# Patient Record
Sex: FEMALE | Race: BLACK OR AFRICAN AMERICAN | NOT HISPANIC OR LATINO
[De-identification: names, ages, dates, MRNs, and addresses within clinical notes are randomized per-mention and may not be internally consistent; named-entity substitution may affect disease eponyms.]

---

## 2017-04-30 ENCOUNTER — TRANSCRIPTION ENCOUNTER (OUTPATIENT)
Age: 37
End: 2017-04-30

## 2018-08-09 ENCOUNTER — TRANSCRIPTION ENCOUNTER (OUTPATIENT)
Age: 38
End: 2018-08-09

## 2019-04-08 ENCOUNTER — TRANSCRIPTION ENCOUNTER (OUTPATIENT)
Age: 39
End: 2019-04-08

## 2019-08-23 ENCOUNTER — EMERGENCY (EMERGENCY)
Facility: HOSPITAL | Age: 39
LOS: 1 days | Discharge: HOME | End: 2019-08-23
Attending: EMERGENCY MEDICINE
Payer: COMMERCIAL

## 2019-08-23 VITALS
RESPIRATION RATE: 16 BRPM | WEIGHT: 156.97 LBS | HEART RATE: 70 BPM | HEIGHT: 67 IN | OXYGEN SATURATION: 98 % | TEMPERATURE: 99 F | DIASTOLIC BLOOD PRESSURE: 96 MMHG | SYSTOLIC BLOOD PRESSURE: 138 MMHG

## 2019-08-23 PROCEDURE — 99282 EMERGENCY DEPT VISIT SF MDM: CPT

## 2019-08-23 PROCEDURE — 99053 MED SERV 10PM-8AM 24 HR FAC: CPT

## 2019-08-23 NOTE — ED PROVIDER NOTE - CLINICAL SUMMARY MEDICAL DECISION MAKING FREE TEXT BOX
38 yo f presents after sputum exposure to eyes in pt with + blood cultures. pt declines to be put on post exposure prophylaxis. will send post exposure labs, pt agrees with plan to f/u. pt having no current sx. NO blood exposure noted. dc

## 2019-08-23 NOTE — ED ADULT NURSE NOTE - CHPI ED NUR SYMPTOMS NEG
no fever/no nausea/no weakness/no decreased eating/drinking/no dizziness/no vomiting/no pain/no tingling/no chills

## 2019-08-23 NOTE — ED ADULT NURSE NOTE - OBJECTIVE STATEMENT
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  Pt here for Occupational exposure workup. No acute distress noted. Pt reports  immediately flushing her eyes out with water. Provider at bedside.

## 2019-08-23 NOTE — ED PROVIDER NOTE - NSFOLLOWUPINSTRUCTIONS_ED_ALL_ED_FT
1) Please follow up with your Primary Care Provider in 24-48 hours  2) Seek immediate medical care for any new or returning symptoms including but not limited eye burning, vision difficulties, excessive tearing

## 2019-08-23 NOTE — ED PROVIDER NOTE - PHYSICAL EXAMINATION
General: well appearing, interactive, well nourished, no apparent distress  HEENT: EOMI, PERRLA, normal mucosa, normal oropharynx, no lesions on the lips or on oral mucosa, normal external ear  Neck: supple, no lymphadenopathy, full range of motion, no nuchal rigidity  Resp: non labored breathing  Abd: non-distended  MSK: full range of motion  Neuro: moving all extremities  Skin: no rashes, skin intact

## 2019-08-23 NOTE — ED PROVIDER NOTE - ATTENDING CONTRIBUTION TO CARE
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 She washed her face and flushed her eyes for about 10-15 minutes. Patient is concerned because patient is growing gram positive rods in his cultures. She is also concerned because she is going to [facility] on vacation. Denies any recent illnesses. No fevers, chills, nausea, vomiting, cough, chest pain or sob.     VS noted  Gen. no acute distress, Non toxic   HEENT: EOMI, PERRL, mmm, normal conjunctiva, pharynx w/o erythema or exudate  Lungs: CTAB/L no C/ W /R   CVS: RRR   Abd; Soft non tender, non distended   Ext: no edema  Skin: no rash  Neuro AAOx3 non focal clear speech  a/p: sputum in eyes - pt already flushed eyes. Patient declines any prophylaxis but accepted testing for exposure. Paperwork completed and labs drawn.   - Luis MENON

## 2019-08-23 NOTE — ED PROVIDER NOTE - OBJECTIVE STATEMENT
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 pt states she was taking care of a pt who has been spiking intermittent fevers for a few days, and has grown gram + rods without known organism. pt coughed into her eyes this morning. pt washed face and flushed eyes for 10-15 min after incident. pt was concerned to be put on prophylactic meds because she is going to [facility] soon and is unsure of quality of medical care there. pt unsure of sources hiv/hcv status.

## 2019-08-23 NOTE — ED PROVIDER NOTE - NS ED ROS FT
GENERAL: No fever or chills, //             EYES: no change in vision, //             HEENT: no trouble swallowing or speaking, //             CARDIAC: no chest pain, //              PULMONARY: no cough or SOB, //             GI: no abdominal pain, no nausea or no vomiting, no diarrhea or constipation, //             : No changes in urination,  //            SKIN: no rashes,  //            NEURO: no headache,  //             MSK: No joint pain otherwise as HPI or negative. ~Darwin Lowery DO PGY1

## 2020-04-10 ENCOUNTER — EMERGENCY (EMERGENCY)
Facility: HOSPITAL | Age: 40
LOS: 1 days | Discharge: HOME | End: 2020-04-10
Attending: EMERGENCY MEDICINE
Payer: COMMERCIAL

## 2020-04-10 VITALS
RESPIRATION RATE: 18 BRPM | OXYGEN SATURATION: 100 % | WEIGHT: 151.9 LBS | SYSTOLIC BLOOD PRESSURE: 151 MMHG | HEART RATE: 100 BPM | TEMPERATURE: 100 F | HEIGHT: 68 IN | DIASTOLIC BLOOD PRESSURE: 93 MMHG

## 2020-04-10 VITALS
RESPIRATION RATE: 19 BRPM | HEART RATE: 89 BPM | DIASTOLIC BLOOD PRESSURE: 92 MMHG | OXYGEN SATURATION: 100 % | SYSTOLIC BLOOD PRESSURE: 139 MMHG

## 2020-04-10 LAB — SARS-COV-2 RNA SPEC QL NAA+PROBE: SIGNIFICANT CHANGE UP

## 2020-04-10 PROCEDURE — 87635 SARS-COV-2 COVID-19 AMP PRB: CPT

## 2020-04-10 PROCEDURE — 99283 EMERGENCY DEPT VISIT LOW MDM: CPT

## 2020-04-10 PROCEDURE — 99284 EMERGENCY DEPT VISIT MOD MDM: CPT

## 2020-04-10 NOTE — ED PROVIDER NOTE - OBJECTIVE STATEMENT
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 Pt's  is requiring she be tested prior to returning to work. Pt denies f/c/n/v/d. Pt denies cough or SOB or CP.

## 2020-04-10 NOTE — ED ADULT NURSE NOTE - OBJECTIVE STATEMENT
40 y/o female here for respiratory virus rule out. C/o intermittent cough, sore throat, temp of 99F. 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 No SOB, chest pain, fever. PT well appearing, in no acute distress. Education and discharge instructions provided and pt ambulated independently out of building.

## 2020-04-10 NOTE — ED PROVIDER NOTE - PATIENT PORTAL LINK FT
You can access the FollowMyHealth Patient Portal offered by [facility] by registering at the following website: [email or web address]. By joining [employer]’s FollowMyHealth portal, you will also be able to view your health information using other applications (apps) compatible with our system.

## 2020-04-10 NOTE — ED PROVIDER NOTE - CLINICAL SUMMARY MEDICAL DECISION MAKING FREE TEXT BOX
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 VS stable, O2 sats 98% RA, stable for dc -[place] Pt is 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 VS stable, O2 sats 98% RA, stable for dc -[facility]  ATTG: : HCP with concern for COVID -19 with known exposure. as per current hospital policy will test for COVID and rec patient continue self quarantine protocols until results available.

## 2020-04-10 NOTE — ED PROVIDER NOTE - ATTENDING CONTRIBUTION TO CARE
40 y/o f with pmhx HTN, presents for sorethroat that began approx 2 days ago. 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 no fever no cough. no chest pain no heart palp. no diarrhea. no change in smell / taste. Has cared for several COVID-19 pos patients. no recent travel.   Gen.  no acute distress  HEENT:  perrl eomi  Lungs:  b/l bs  CVS: S1S2   Abd;  soft non tender no distention  Ext: no pitting edema or erythema  Neuro: aaox3 no focal deficits  MSK: strength 5/5 b/l upper and lower ext.

## 2020-04-10 NOTE — ED ADULT NURSE NOTE - CHPI ED NUR SYMPTOMS NEG
no chills/no rash/no shortness of breath/no vomiting/no headache/no diarrhea/no abdominal pain/no decreased eating/drinking/no fever

## 2020-04-10 NOTE — ED PROVIDER NOTE - NSFOLLOWUPINSTRUCTIONS_ED_ALL_ED_FT
YOU SHOULD RECEIVE A TEXT WITH THE RESULTS OF YOUR SWAB TODAY    IF YOU DO NOT RECEIVE THE TEXT FOR SOME REASON YOU CAN ALWAYS CALL MEDICAL RECORDS TO HAVE YOUR RESULTS RELEASED    PLEASE FOLLOW UP WITH YOUR PRIMARY DOCTOR    PLEASE RETURN TO THE EMERGENCY ROOM IF YOU HAVE NEW DIFFICULTY BREATHING, IF YOU FAINT OR FEEL LIKE YOU ARE GOING TO FAINT, IF YOU HAVE SEVERE FEVERS/CHILLS THAT DON'T IMPROVE WITH TYLENOL OR IBUPROFEN, OR IF YOU HAVE ANY OTHER NEW SYMPTOMS THAT ARE CONCERNING

## 2020-04-25 ENCOUNTER — MESSAGE (OUTPATIENT)
Age: 40
End: 2020-04-25

## 2020-05-02 ENCOUNTER — APPOINTMENT (OUTPATIENT)
Dept: DISASTER EMERGENCY | Facility: HOSPITAL | Age: 40
End: 2020-05-02

## 2020-05-03 LAB
SARS-COV-2 IGG SERPL IA-ACNC: 0.1 INDEX
SARS-COV-2 IGG SERPL QL IA: NEGATIVE

## 2020-11-19 ENCOUNTER — APPOINTMENT (OUTPATIENT)
Dept: ORTHOPEDIC SURGERY | Facility: CLINIC | Age: 40
End: 2020-11-19
Payer: COMMERCIAL

## 2020-11-19 VITALS — WEIGHT: 151 LBS | HEIGHT: 68 IN | BODY MASS INDEX: 22.88 KG/M2

## 2020-11-19 DIAGNOSIS — G89.29 LUMBAGO WITH SCIATICA, RIGHT SIDE: ICD-10-CM

## 2020-11-19 DIAGNOSIS — M54.41 LUMBAGO WITH SCIATICA, RIGHT SIDE: ICD-10-CM

## 2020-11-19 DIAGNOSIS — Z78.9 OTHER SPECIFIED HEALTH STATUS: ICD-10-CM

## 2020-11-19 DIAGNOSIS — Z86.79 PERSONAL HISTORY OF OTHER DISEASES OF THE CIRCULATORY SYSTEM: ICD-10-CM

## 2020-11-19 DIAGNOSIS — Z83.511 FAMILY HISTORY OF GLAUCOMA: ICD-10-CM

## 2020-11-19 DIAGNOSIS — Z82.49 FAMILY HISTORY OF ISCHEMIC HEART DISEASE AND OTHER DISEASES OF THE CIRCULATORY SYSTEM: ICD-10-CM

## 2020-11-19 PROCEDURE — 99204 OFFICE O/P NEW MOD 45 MIN: CPT

## 2020-11-19 PROCEDURE — 73502 X-RAY EXAM HIP UNI 2-3 VIEWS: CPT | Mod: RT

## 2020-11-19 PROCEDURE — 72100 X-RAY EXAM L-S SPINE 2/3 VWS: CPT

## 2020-11-19 RX ORDER — CYCLOBENZAPRINE HYDROCHLORIDE 5 MG/1
5 TABLET, FILM COATED ORAL
Qty: 28 | Refills: 0 | Status: ACTIVE | COMMUNITY
Start: 2020-11-19 | End: 1900-01-01

## 2020-11-19 RX ORDER — VALSARTAN 160 MG/1
160 TABLET ORAL
Refills: 0 | Status: ACTIVE | COMMUNITY

## 2020-11-19 RX ORDER — PREDNISONE 50 MG/1
50 TABLET ORAL DAILY
Qty: 5 | Refills: 0 | Status: ACTIVE | COMMUNITY
Start: 2020-11-19 | End: 1900-01-01

## 2020-11-19 RX ORDER — MELOXICAM 7.5 MG/1
7.5 TABLET ORAL
Qty: 14 | Refills: 0 | Status: ACTIVE | COMMUNITY
Start: 2020-11-19 | End: 1900-01-01

## 2020-11-21 NOTE — PHYSICAL EXAM
[de-identified] : LUMBAR SPINE\par Inspection reveals no scoliosis\par Range of motion testing demonstrates pain with flexion, full ROM\par Facet loading maneuver negative\par + tenderness to palpation of lumbar paraspinal muscles. \par Seated slump test negative\par Straight leg raise negative\par HIPS/PELVIS -\par Symmetric in standing and lying \par Hip maneuvers:\par  Range of motion full and painfree\par passive hip impingement sign negative\par MANUEL mild +\par FADIR mild +\par Log roll negative\par Sacroiliac maneuvers:no TTP of  bilateral PSIS \par AP glide negative\par Yazan's negative\par Resisted active straight leg raise negative\par + TTP of the rightbuttock, greater trochanters, IT band \par NEURO - Normal bulk and tone \par LE strength 5/5 including hip flexion, knee flexion, knee extension, ankle dorsiflexion, ankle plantarflexion, eversion, and EHL \par Toe-walking and heel-walking intact\par Sensation - intact to light touch in bilateral lower extremities. \par LE Reflexes 2+ patellar and Achilles reflexes bilaterally \par no Clonus\par Coordination was age appropriate and intact in all 4 limbs. \par GAIT - Normal base, normal stride length, non-antalgic\par Single leg squat with contralateral pelvic tilt and medial knee drive bilaterally.\par \par  [de-identified] : \par The following radiographs were ordered and read by me during this patient's visit. I reviewed each radiograph in detail with the patient and discussed the findings as highlighted below. \par \par 3 views of the right hip and 2 views of the lumbar spine were obtained today that show no fracture, or dislocation. There are no degenerative changes seen. There is no malalignment. No obvious osseous abnormality. Otherwise unremarkable.\par

## 2020-11-21 NOTE — HISTORY OF PRESENT ILLNESS
[de-identified] : Ms. VANDANA SAAB is a very pleasant 40 year female who comes in for evaluation of Right sided LBP and R buttock pain that has been ongoing for 4 months without any specific injury or inciting event. 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 Patient states that she initially had right foot pain, saw a podiatrist, was dx with plantar fasciitis. She was treated with PT and a CSI with relief. During this time, she also noted that she has right sided low back pain with radiation into the groin, buttock and down the posterior thigh up to the knee, initially intermittent, has now become constant in nature and described as sharp and achy . The pain is rated as 10/10 during today's visit, and ranges from 3-10/10. The patient's symptoms are aggravated by prolonged standing, driving, bending and twisting and alleviated by motrin 600 1x daily PRN before shifts and heat . The patient denies any night pain, numbness/tingling, weakness, or bowel/bladder dysfunction. The patient has no other complaints at this time.\par \par No imaging of the low back. \par Has done PT for plantar fasciitis. \par

## 2020-11-21 NOTE — DISCUSSION/SUMMARY
[de-identified] : Discussed findings of today's exam and possible causes of patient's pain.  Educated patient on their most probable diagnosis of lumbar radiculopathy, gluteal tendinopathy, and gluteus medius weakness.  Reviewed possible courses of treatment, and we collaboratively decided best course of treatment at this time will include:\par 1. referral to PT to improve gluteal strength\par 2. short course of prednisone followed by meloxicam if needed\par 3. cyclobenzaprine prn nightly \par \par Follow up in 6 weeks.\par \par Nasra Larsen MD, EdM\par Sports Medicine PM&R\par \par

## 2021-03-18 ENCOUNTER — OUTPATIENT (OUTPATIENT)
Dept: OUTPATIENT SERVICES | Facility: HOSPITAL | Age: 41
LOS: 1 days | End: 2021-03-18
Payer: COMMERCIAL

## 2021-03-18 ENCOUNTER — APPOINTMENT (OUTPATIENT)
Dept: MAMMOGRAPHY | Facility: IMAGING CENTER | Age: 41
End: 2021-03-18
Payer: COMMERCIAL

## 2021-03-18 DIAGNOSIS — Z00.8 ENCOUNTER FOR OTHER GENERAL EXAMINATION: ICD-10-CM

## 2021-03-18 PROCEDURE — 77063 BREAST TOMOSYNTHESIS BI: CPT | Mod: 26

## 2021-03-18 PROCEDURE — 77063 BREAST TOMOSYNTHESIS BI: CPT

## 2021-03-18 PROCEDURE — 77067 SCR MAMMO BI INCL CAD: CPT | Mod: 26

## 2021-03-18 PROCEDURE — 77067 SCR MAMMO BI INCL CAD: CPT

## 2021-05-05 ENCOUNTER — APPOINTMENT (OUTPATIENT)
Dept: ORTHOPEDIC SURGERY | Facility: CLINIC | Age: 41
End: 2021-05-05
Payer: COMMERCIAL

## 2021-05-05 VITALS — SYSTOLIC BLOOD PRESSURE: 136 MMHG | HEIGHT: 68 IN | DIASTOLIC BLOOD PRESSURE: 83 MMHG | HEART RATE: 72 BPM

## 2021-05-05 DIAGNOSIS — M22.2X2 PATELLOFEMORAL DISORDERS, RIGHT KNEE: ICD-10-CM

## 2021-05-05 DIAGNOSIS — Z00.00 ENCOUNTER FOR GENERAL ADULT MEDICAL EXAMINATION W/OUT ABNORMAL FINDINGS: ICD-10-CM

## 2021-05-05 DIAGNOSIS — M22.2X1 PATELLOFEMORAL DISORDERS, RIGHT KNEE: ICD-10-CM

## 2021-05-05 PROCEDURE — 73564 X-RAY EXAM KNEE 4 OR MORE: CPT | Mod: 50

## 2021-05-05 PROCEDURE — 99072 ADDL SUPL MATRL&STAF TM PHE: CPT

## 2021-05-05 PROCEDURE — 99213 OFFICE O/P EST LOW 20 MIN: CPT

## 2021-05-05 NOTE — DISCUSSION/SUMMARY
[de-identified] : The patient has bilat patellofemoral sybndrome. They are not an appropriate candidate for surgical intervention at this time. An extensive discussion was conducted on the natural history of the disease and the variety of surgical and non-surgical options available to the patient including, but not limited to non-steroidal anti-inflammatory medications, steroid injections, physical therapy, maintenance of ideal body weight, and reduction of activity. I recommended and prescribed a course of physical therapy.  Over-the-counter NSAIDs.  Pain.  Recommended to use a neoprene sleeve knee brace.  The patient will schedule an appointment as needed.

## 2021-05-05 NOTE — PHYSICAL EXAM
[de-identified] : Patient is well nourished, well-developed, in no acute distress, with appropriate mood and affect. The patient is oriented to time, place, and person. Respirations are even and unlabored. Gait evaluation does not reveal a limp. There is no inguinal adenopathy. The right limb is well-perfused and showed 2+ dp/pt pulses, without skin lesions, shows a grossly normal motor and sensory examination. Right knee motion is painless and the knee moves from 0 to 135 degrees. The knee is stable within that range-of-motion to AP and ML stress with a 1A Lachman, negative anterior or posterior drawer and no instability to varus or valgus stress. The alignment of the knee is 5 degrees varus. No effusion or crepitus is noted. No tenderness to palpation about the medial or lateral joint line, medial or lateral tibial plateau, medial or lateral femoral condyle, medial or lateral patellar facets, superior or inferior pole of the patella. Zaid's is negative. Muscle strength is normal. Pedal pulses are palpable. Hip examination was negative. The left limb is well-perfused and showed 2+ dp/pt pulses, without skin lesions, shows a grossly normal motor and sensory examination. Left knee motion is painless and the knee moves from 0 to 135 degrees. The knee is stable within that range-of-motion to AP and ML stress with a 1A Lachman, negative anterior or posterior drawer and no instability to varus or valgus stress. The alignment of the knee is 5 degrees varus. No effusion or crepitus is noted. No tenderness to palpation about the medial or lateral joint line, medial or lateral tibial plateau, medial or lateral femoral condyle, medial or lateral patellar facets, superior or inferior pole of the patella. Zaid's is negative. Muscle strength is normal. Pedal pulses are palpable. Hip examination was negative. [de-identified] : Long standing knee, AP knee, lateral knee, and patellar views of the bilateral knee were ordered and taken in the office and demonstrate no evidence of degenerative joint disease of the knee, fractures, intra-articular pathology.

## 2021-05-05 NOTE — HISTORY OF PRESENT ILLNESS
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 The pain somewhat limits activities of daily living. Walking tolerance is reduced. There has been no treatment thus far except for Advil 600mg prn  The patient denies any radiation of the pain to the feet and it is not associated with numbness, tingling, or weakness.  She denies any catching clicking or locking in the knees and the knee is not giving way.  She does not use a cane or walker.  She has not tried formal physical therapy.

## 2021-10-20 ENCOUNTER — NON-APPOINTMENT (OUTPATIENT)
Age: 41
End: 2021-10-20

## 2021-10-21 ENCOUNTER — APPOINTMENT (OUTPATIENT)
Dept: ORTHOPEDIC SURGERY | Facility: CLINIC | Age: 41
End: 2021-10-21
Payer: COMMERCIAL

## 2021-10-21 VITALS
DIASTOLIC BLOOD PRESSURE: 76 MMHG | HEIGHT: 68 IN | BODY MASS INDEX: 22.88 KG/M2 | HEART RATE: 79 BPM | SYSTOLIC BLOOD PRESSURE: 115 MMHG | WEIGHT: 151 LBS

## 2021-10-21 DIAGNOSIS — M75.42 IMPINGEMENT SYNDROME OF LEFT SHOULDER: ICD-10-CM

## 2021-10-21 PROCEDURE — 99203 OFFICE O/P NEW LOW 30 MIN: CPT

## 2021-10-21 RX ORDER — MELOXICAM 7.5 MG/1
7.5 TABLET ORAL
Qty: 30 | Refills: 0 | Status: ACTIVE | COMMUNITY
Start: 2021-10-21 | End: 1900-01-01

## 2021-10-21 NOTE — DISCUSSION/SUMMARY
[de-identified] : Discussed findings of today's exam and possible causes of patient's pain.  Educated patient on their most probable diagnosis of left shoulder impingement, low likelihood of rotator cuff tear.  Reviewed possible courses of treatment, and we collaboratively decided best course of treatment at this time will include conservative management.  Patient started on a course of oral NSAIDs, prescription given for Mobic (We discussed all possible side effects of this medication).  Patient will be started on a course of physical therapy to restore normal range of motion and strength as tolerated.  May consider cortisone injection if not improving with the above measures.  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  Follow up as needed.  Patient appreciates and agrees with current plan.\par \par I work as part of an academic orthopedic group and routinely have a physician in training (resident / fellow) working with me.  Any part of the history and physical exam performed by the physician in training was either directly reviewed and/or replicated by myself.  Any procedure performed by the physician in training was performed under my direct supervision and with the consent of the patient.\par \par This note was generated using dragon medical dictation software.  A reasonable effort has been made for proofreading its contents, but typos may still remain.  If there are any questions or points of clarification needed please notify my office.\par

## 2021-10-21 NOTE — PHYSICAL EXAM
[de-identified] : Constitutional: Well-nourished, well-developed, No acute distress\par Respiratory:  Good respiratory effort, no SOB\par Psychiatric: Pleasant and normal affect, alert and oriented x3\par Skin: Clean dry and intact B/L UE\par Musculoskeletal: normal except where as noted in regional exam\par \par \par Right Shoulder:\par APPEARANCE: no marked deformities, no swelling or malalignment\par POSITIVE TENDERNESS: none\par NONTENDER: supraspinatus, infraspinatus, teres minor, LH biceps, anterior and posterior capsule, AC joint\par ROM: full & painless, no scapular winging or dyskinesia present\par RESISTIVE TESTING: painless 5/5 resisted flex/ext, empty can/ER/IR, horizontal abd/add \par SPECIAL TESTS: neg Drop Arm, neg Empty Can, neg Eisenberg/Neers, neg Russell's, neg Speeds, neg Apprehension, neg cross arm adduction, neg apley's scratch test\par \par Left Shoulder:\par APPEARANCE: no marked deformities, no swelling or malalignment\par POSITIVE TENDERNESS: supraspinatus, long head biceps tendon\par NONTENDER:  infraspinatus, teres minor. biceps. anterior and posterior capsule. AC joint. \par ROM: full with mild painful arc past 60 degrees, no scapular winging or dyskinesia present\par RESISTIVE TESTING: MMT 4+/5 ER, Flexion and Empty can, 5/5 IR. painless 5/5 resisted ext, horizontal abd/add \par SPECIAL TESTS: + Eisenberg and Neers, mildly + cross arm adduction, + Speeds, neg Russell's, neg Drop Arm, neg Apprehension. neg apley's scratch test\par \par

## 2021-10-21 NOTE — HISTORY OF PRESENT ILLNESS
[de-identified] : RHD 42 y/o F presents with 1 week of constant L shoulder pain. Pain started after moving a chair, she felt a sharp pain in her anterior superior shoulder. Pain has been a constant burning ache, but when she lifts her arm is becomes sharp. She has had an episode of numbness and tingling radiating down her entire arm, that went away after stretching and massaging it. She notices increased pain with motion. She has difficulty sleeping on her left side. She has taken Motrin 600mg with temporary relief. She uses ice with temporary relief. Pain remain in superior anterior shoulder mostly. She denies images.\par \par The patient's past medical history, past surgical history, medications and allergies were reviewed by me today and documented accordingly. In addition, the patient's family and social history, which were noncontributory to this visit, were reviewed also. Intake form was reviewed. The patient has no family history of arthritis.

## 2022-06-13 ENCOUNTER — OUTPATIENT (OUTPATIENT)
Dept: OUTPATIENT SERVICES | Facility: HOSPITAL | Age: 42
LOS: 1 days | End: 2022-06-13
Payer: COMMERCIAL

## 2022-06-13 ENCOUNTER — APPOINTMENT (OUTPATIENT)
Dept: MAMMOGRAPHY | Facility: IMAGING CENTER | Age: 42
End: 2022-06-13
Payer: COMMERCIAL

## 2022-06-13 DIAGNOSIS — Z00.8 ENCOUNTER FOR OTHER GENERAL EXAMINATION: ICD-10-CM

## 2022-06-13 PROCEDURE — 77063 BREAST TOMOSYNTHESIS BI: CPT

## 2022-06-13 PROCEDURE — 77067 SCR MAMMO BI INCL CAD: CPT | Mod: 26

## 2022-06-13 PROCEDURE — 77067 SCR MAMMO BI INCL CAD: CPT

## 2022-06-13 PROCEDURE — 77063 BREAST TOMOSYNTHESIS BI: CPT | Mod: 26

## 2022-08-03 ENCOUNTER — OUTPATIENT (OUTPATIENT)
Dept: OUTPATIENT SERVICES | Facility: HOSPITAL | Age: 42
LOS: 1 days | End: 2022-08-03
Payer: COMMERCIAL

## 2022-08-03 ENCOUNTER — APPOINTMENT (OUTPATIENT)
Dept: ULTRASOUND IMAGING | Facility: IMAGING CENTER | Age: 42
End: 2022-08-03

## 2022-08-03 DIAGNOSIS — Z00.8 ENCOUNTER FOR OTHER GENERAL EXAMINATION: ICD-10-CM

## 2022-08-03 PROCEDURE — 76641 ULTRASOUND BREAST COMPLETE: CPT | Mod: 26,50

## 2022-08-03 PROCEDURE — 76641 ULTRASOUND BREAST COMPLETE: CPT

## 2022-08-04 ENCOUNTER — NON-APPOINTMENT (OUTPATIENT)
Age: 42
End: 2022-08-04

## 2023-01-19 ENCOUNTER — NON-APPOINTMENT (OUTPATIENT)
Age: 43
End: 2023-01-19

## 2023-05-09 ENCOUNTER — NON-APPOINTMENT (OUTPATIENT)
Age: 43
End: 2023-05-09

## 2023-05-09 ENCOUNTER — APPOINTMENT (OUTPATIENT)
Dept: ORTHOPEDIC SURGERY | Facility: CLINIC | Age: 43
End: 2023-05-09
Payer: OTHER MISCELLANEOUS

## 2023-05-09 PROCEDURE — 99213 OFFICE O/P EST LOW 20 MIN: CPT

## 2023-05-09 RX ORDER — MELOXICAM 7.5 MG/1
7.5 TABLET ORAL
Qty: 30 | Refills: 0 | Status: ACTIVE | COMMUNITY
Start: 2023-05-09 | End: 1900-01-01

## 2023-05-09 NOTE — PHYSICAL EXAM
[de-identified] : Constitutional: Well-nourished, well-developed, No acute distress\par Respiratory:  Good respiratory effort, no SOB\par Lymphatic: No regional lymphadenopathy, no lymphedema\par Psychiatric: Pleasant and normal affect, alert and oriented x3\par Musculoskeletal: normal except where as noted in regional exam\par \par \par Right Shoulder:\par APPEARANCE: no marked deformities, no swelling or malalignment\par POSITIVE TENDERNESS: supraspinatus, long head biceps tendon\par NONTENDER:  infraspinatus, teres minor. biceps. anterior and posterior capsule. AC joint. \par ROM: full with mild painful arc past 60 degrees, no scapular winging or dyskinesia present\par RESISTIVE TESTING: MMT 4+/5 ER, Flexion and Empty can, 5/5 IR. painless 5/5 resisted ext, horizontal abd/add \par SPECIAL TESTS: + Eisenberg and Neers, mildly + cross arm adduction, + Speeds, neg Russell's, neg Drop Arm, neg Apprehension. neg apley's scratch test\par \par

## 2023-05-09 NOTE — RETURN TO WORK/SCHOOL
[FreeTextEntry1] : Silvia was seen today for evaluation of right shoulder orthopedic injury.  Please excuse her from work until reassessed in 2 weeks.  This note expires on 5/23/2023.\par Should you have any questions please call the office at [phone]\par Thank you for your understanding.\par \par Sincerely,\par \par Kory Black DO, ATC\par Primary Care Sports Medicine\par [facility] [place]\par

## 2023-05-09 NOTE — DISCUSSION/SUMMARY
[de-identified] : Discussed findings of today's exam and possible causes of patient's pain.  Educated patient on their most probable diagnosis of acute right shoulder impingement with concomitant bicipital tenosynovitis.  Reviewed possible courses of treatment, and we collaboratively decided best course of treatment at this time will include conservative management.  Patient has no evidence of any significant myofascial tear or rupture, no surgical indications, no need for MRI at this time.  Patient started on a course of oral NSAIDs, prescription given for Mobic (We discussed all possible side effects of this medication).  Patient will be started on a course of physical therapy to restore normal range of motion and strength as tolerated.  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  Recommend she be out of work for the next 2 weeks, we will reassess her at that time.  Patient appreciates and agrees with current plan.\par \par I work as part of an academic orthopedic group and routinely have a physician in training (resident / fellow) working with me.  Any part of the history and physical exam performed by the physician in training was either directly reviewed and/or replicated by myself.  Any procedure performed by the physician in training was performed under my direct supervision and with the consent of the patient.\par \par This note was generated using dragon medical dictation software.  A reasonable effort has been made for proofreading its contents, but typos may still remain.  If there are any questions or points of clarification needed please notify my office.

## 2023-05-09 NOTE — HISTORY OF PRESENT ILLNESS
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 She has done gentle stretching, ice, motrin however pain is still present. This injury occurred Saturday morning, 4 day prior to today. Pain is localized anterior shoulder, she denies radicular features.

## 2023-05-22 ENCOUNTER — APPOINTMENT (OUTPATIENT)
Dept: ORTHOPEDIC SURGERY | Facility: CLINIC | Age: 43
End: 2023-05-22
Payer: OTHER MISCELLANEOUS

## 2023-05-22 VITALS — BODY MASS INDEX: 23.95 KG/M2 | HEIGHT: 68 IN | WEIGHT: 158 LBS

## 2023-05-22 PROCEDURE — 99213 OFFICE O/P EST LOW 20 MIN: CPT

## 2023-05-22 NOTE — PHYSICAL EXAM
[de-identified] : Constitutional: Well-nourished, well-developed, No acute distress\par Respiratory:  Good respiratory effort, no SOB\par Lymphatic: No regional lymphadenopathy, no lymphedema\par Psychiatric: Pleasant and normal affect, alert and oriented x3\par Musculoskeletal: normal except where as noted in regional exam\par \par \par Right Shoulder:\par APPEARANCE: no marked deformities, no swelling or malalignment\par POSITIVE TENDERNESS: supraspinatus, long head biceps tendon\par NONTENDER:  infraspinatus, teres minor. biceps. anterior and posterior capsule. AC joint. \par ROM: full with mild painful arc past 60 degrees, no scapular winging or dyskinesia present\par RESISTIVE TESTING: MMT 4+/5 ER, Flexion and Empty can, 5/5 IR. painless 5/5 resisted ext, horizontal abd/add \par SPECIAL TESTS: + Eisenberg and Neers, mildly + cross arm adduction, + Speeds, neg Russell's, neg Drop Arm, neg Apprehension. neg apley's scratch test\par \par

## 2023-05-22 NOTE — RETURN TO WORK/SCHOOL
[FreeTextEntry1] : Silvia was seen today for reevaluation of right shoulder orthopedic injury.  She is still 100% temporarily disabled and cannot perform her work duties.  She will be reassessed in 2 weeks.  This note expires on 6/5/2023.\par Should you have any questions please call the office at [phone]\par Thank you for your understanding.\par \par Sincerely,\par \par Kory Black DO, ATC\par Primary Care Sports Medicine\par [facility] [place]\par

## 2023-05-22 NOTE — DISCUSSION/SUMMARY
[de-identified] : Patient was seen today for reevaluation of right shoulder impingement.  Patient has only been able to attend 2 visits of physical therapy since last evaluation.  At this time she has no significant change in her clinical exam, recommend continued conservative measures.  No indication for MRI at this time.  Patient will continue meloxicam once a day with food and Tylenol as needed for breakthrough pain.  She will continue her course of physical therapy.  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  If patient still having persisting pain at that time may consider cortisone injection.  I think it is too soon to consider cortisone injection only 2 weeks after initial injury and only 2 visits of therapy thus far.  Patient appreciates and agrees with current plan.\par  \par \par This note was generated using dragon medical dictation software.  A reasonable effort has been made for proofreading its contents, but typos may still remain.  If there are any questions or points of clarification needed please notify my office.

## 2023-05-22 NOTE — HISTORY OF PRESENT ILLNESS
[de-identified] : Patient is here for right shoulder pain follow up. Patient has attended 2 PT sessions and taken Meloxicam since last visit. There was no recent injury.\par Patient states that she has noticed some neck/left shoulder pain which she feels is from compensating due to this injury. She was advised as this is not part of her WC claim we cannot address this issue today.

## 2023-06-05 ENCOUNTER — APPOINTMENT (OUTPATIENT)
Dept: ORTHOPEDIC SURGERY | Facility: CLINIC | Age: 43
End: 2023-06-05
Payer: OTHER MISCELLANEOUS

## 2023-06-05 VITALS — BODY MASS INDEX: 24.25 KG/M2 | WEIGHT: 160 LBS | HEIGHT: 68 IN

## 2023-06-05 PROCEDURE — 99214 OFFICE O/P EST MOD 30 MIN: CPT

## 2023-06-05 PROCEDURE — 73030 X-RAY EXAM OF SHOULDER: CPT | Mod: RT

## 2023-06-05 NOTE — PHYSICAL EXAM
[de-identified] : Constitutional: Well-nourished, well-developed, No acute distress\par Respiratory:  Good respiratory effort, no SOB\par Lymphatic: No regional lymphadenopathy, no lymphedema\par Psychiatric: Pleasant and normal affect, alert and oriented x3\par Musculoskeletal: normal except where as noted in regional exam\par \par \par Right Shoulder:\par APPEARANCE: no marked deformities, no swelling or malalignment\par POSITIVE TENDERNESS: supraspinatus, long head biceps tendon\par NONTENDER:  infraspinatus, teres minor. biceps. anterior and posterior capsule. AC joint. \par ROM: full with mild painful arc past 60 degrees, no scapular winging or dyskinesia present\par RESISTIVE TESTING: MMT 4+/5 ER, Flexion and Empty can, 5/5 IR. painless 5/5 resisted ext, horizontal abd/add \par SPECIAL TESTS: + Eisenberg and Neers, mildly + cross arm adduction, + Speeds, neg Russell's, neg Drop Arm, neg Apprehension. neg apley's scratch test\par \par  [de-identified] : The following radiographs were ordered and read by me during this patient's visit. I reviewed each radiograph in detail with the patient and discussed the findings as highlighted below. \par \par 3 views of the right shoulder were obtained today that show no fracture, or dislocation. There are no degenerative changes seen. There is no malalignment. No obvious osseous abnormality. Otherwise unremarkable.\par \par

## 2023-06-05 NOTE — DISCUSSION/SUMMARY
[de-identified] : Patient was seen today for reevaluation of persisting right shoulder pain secondary to subacromial impingement and bicipital tenosynovitis.  Patient has had little response to conservative measures including meloxicam and 8+ visits of physical therapy.  With lack of response to conservative measures and continued pain I recommend we proceed with MRI to evaluate for extent of soft tissue pathology.  Patient feels like the meloxicam is not effective for her anymore.  Patient started on a course of oral NSAIDs, prescription given for diclofenac (We discussed all possible side effects of this medication).  Patient will continue her course of physical therapy.  Depending on results of her MRI we may consider surgical consultation versus cortisone injection.  Patient will follow-up as soon as MRI results are available.  Patient appreciates and agrees with current plan.\par \par I work as part of an academic orthopedic group and routinely have a physician in training (resident / fellow) working with me.  Any part of the history and physical exam performed by the physician in training was either directly reviewed and/or replicated by myself.  Any procedure performed by the physician in training was performed under my direct supervision and with the consent of the patient.\par \par This note was generated using dragon medical dictation software.  A reasonable effort has been made for proofreading its contents, but typos may still remain.  If there are any questions or points of clarification needed please notify my office.

## 2023-06-05 NOTE — RETURN TO WORK/SCHOOL
[FreeTextEntry1] : Silvia was seen today for reevaluation of right shoulder pain.  She continues to be unable to perform her work duties.  She is still 100% temporarily disabled.  She is undergoing further evaluation with MRI.  She will be reassessed in 2 weeks.  This note expires on 6/19/2023.\par Should you have any questions please call the office at [phone]\par Thank you for your understanding.\par \par Sincerely,\par \par Koyr Black DO, ATC\par Primary Care Sports Medicine\par [facility] [place]\par

## 2023-06-05 NOTE — HISTORY OF PRESENT ILLNESS
[de-identified] : Patient is here for right shoulder pain follow up. 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 She felt a pop in the anterior right shoulder. Patient has done 8 sessions of PT so far over the last month. She is taking Meloxicam daily and using Tylenol and lidocaine patches for breakthrough pain. Patient still reports pain in the anterior right shoulder with driving and putting on a shirt. Still not back at work. Patient reports mild weakness in the RUE, but no numbness/tingling.

## 2023-06-06 ENCOUNTER — FORM ENCOUNTER (OUTPATIENT)
Age: 43
End: 2023-06-06

## 2023-06-21 ENCOUNTER — APPOINTMENT (OUTPATIENT)
Dept: ORTHOPEDIC SURGERY | Facility: CLINIC | Age: 43
End: 2023-06-21
Payer: OTHER MISCELLANEOUS

## 2023-06-21 PROCEDURE — 20610 DRAIN/INJ JOINT/BURSA W/O US: CPT | Mod: RT

## 2023-06-21 PROCEDURE — 99214 OFFICE O/P EST MOD 30 MIN: CPT | Mod: 25

## 2023-06-21 NOTE — HISTORY OF PRESENT ILLNESS
[de-identified] : Patient is here for right shoulder pain, patient reports she had MRI of right shoulder on 6/13/23. She reports still going to PT and increasing now to 3 session and is helping with pain, however still has burning lateral shoulder pain, dull ache when she externally rotates and abducts. Denies radicular features at this time. \par \par The patient's past medical history, past surgical history, medications and allergies were reviewed by me today and documented accordingly. In addition, the patient's family and social history, which were noncontributory to this visit, were reviewed also. Intake form was reviewed. The patient has no family history of arthritis.

## 2023-06-21 NOTE — RETURN TO WORK/SCHOOL
[FreeTextEntry1] : Silvia was seen today for reevaluation of persisting right shoulder pain.  She is undergoing continued treatment.  She is still unable to return to work.  She will be reassessed in 2 weeks.  This note expires on 7/5/2023.\par Should you have any questions please call the office at [phone]\par Thank you for your understanding.\par \par Sincerely,\par \par Kory Black DO, ATC\par Primary Care Sports Medicine\par [facility] [place]\par

## 2023-06-21 NOTE — PHYSICAL EXAM
[de-identified] : Constitutional: Well-nourished, well-developed, No acute distress\par Respiratory:  Good respiratory effort, no SOB\par Lymphatic: No regional lymphadenopathy, no lymphedema\par Psychiatric: Pleasant and normal affect, alert and oriented x3\par Musculoskeletal: normal except where as noted in regional exam\par \par \par Right Shoulder:\par APPEARANCE: no marked deformities, no swelling or malalignment\par POSITIVE TENDERNESS: supraspinatus, long head biceps tendon\par NONTENDER:  infraspinatus, teres minor. biceps. anterior and posterior capsule. AC joint. \par ROM: full with mild painful arc past 60 degrees, no scapular winging or dyskinesia present\par RESISTIVE TESTING: MMT 4+/5 ER, Flexion and Empty can, 5/5 IR. painless 5/5 resisted ext, horizontal abd/add \par SPECIAL TESTS: + Eisenberg and Neers, mildly + cross arm adduction, + Speeds, neg Russell's, neg Drop Arm, neg Apprehension. neg apley's scratch test\par \par  [de-identified] : I reviewed, interpreted and clinically correlated the following outside imaging studies,\par  Date of Exam: 06-\par  \par EXAM:  MRI RIGHT SHOULDER WITHOUT CONTRAST\par \par HISTORY: right shoulder pain.\par \par TECHNIQUE:  Multiplanar, multi-sequence MRI of the right shoulder was obtained on a 1.5T scanner according to standard protocol.\par \par COMPARISON:  None available.\par \par FINDINGS:  \par \par Bones: No acute fracture or dislocation.\par \par Rotator cuff: Mild supraspinatus tendinosis.  Mild infraspinatus tendinosis and bursal sided fraying. Subscapularis tendon is normal. Teres minor tendon is intact. Obliteration of fat within the rotator interval.\par \par Biceps tendon: Normal. \par \par Acromion/Acromioclavicular joint: No significant acromioclavicular joint arthrosis. No os acromiale.\par \par Bursae: Small fluid within the subacromial/subdeltoid bursa, consistent with bursitis.\par \par Labrum: Evaluation of the labrum is limited due to the paucity of joint fluid. Focal intermediate signal undermining the posterosuperior/posterior labrum may reflect a nondisplaced tear. Otherwise, no labral tear identified. No paralabral cyst.\par \par Glenohumeral joint: No joint effusion or synovitis. Articular cartilage is preserved. Thickening of the glenohumeral joint capsule.\par \par Muscles: Rotator cuff muscles are normal without edema or significant asymmetric fatty atrophy. Deltoid is unremarkable.\par \par Subcutaneous tissues: Unremarkable.\par \par \par \par IMPRESSION:  MRI of the right shoulder demonstrates: \par \par 1.  Mild supraspinatus tendinosis.  Mild infraspinatus tendinosis and bursal sided fraying. \par 2.  Evaluation of the labrum is limited due to the paucity of joint fluid. Focal intermediate signal undermining the posterosuperior/posterior labrum may reflect a nondisplaced tear. \par 3.  Obliteration of fat within the rotator interval. Thickening of the glenohumeral joint capsule. Findings may be seen in the clinical setting of adhesive capsulitis.\par 4.  Mild subacromial/subdeltoid bursitis.\par

## 2023-06-21 NOTE — PROCEDURE
[de-identified] : Injection: Right  Shoulder Subacromial Space.\par Indication: Impingement.\par \par A discussion was had with the patient regarding this procedure and all questions were answered. All risks, benefits and alternatives were discussed. These included but were not limited to bleeding, infection, and allergic reaction.  A timeout was done to ensure correct side and patient agreed to the procedure.  A [place] mary was created on the skin utilizing a plastic needle cap to mary the anticipated point of entry. Alcohol was used to clean the skin, and Betadine was used to sterilize and prep the area in the posterior aspect of the shoulder. Ethyl chloride spray was then used as a topical anesthetic. A 22-gauge 1.5" needle was used to inject 2cc of 0.25% bupivacaine without epinephrine and 1cc of 40mg/ml methylprednisolone into the subacromial space. A sterile bandage was then applied. The patient tolerated the procedure well and there were no complications.\par \par

## 2023-06-21 NOTE — DISCUSSION/SUMMARY
[de-identified] : Patient was seen today for reevaluation of persisting right shoulder pain secondary to subacromial impingement.  We reviewed her MRI which shows she has tendinosis without any evidence of rotator cuff tear, she also has evidence of subacromial bursitis.  No surgical indications, no need for surgical consultation.  We discussed various treatment options as well as associated risk/benefits/alternatives and patient elected to proceed with cortisone injection today (see procedure note).  Informed the patient that the numbing medicine in today's injection will last for about 4-6 hours. The steroid that was injected will start to work in 1 to 2 days, peak at 1-2 weeks, and may last up to 1-2 months.  Patient will continue her course of physical therapy.  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  Patient appreciates and agrees with current plan.\par \par I work as part of an academic orthopedic group and routinely have a physician in training (resident / fellow) working with me.  Any part of the history and physical exam performed by the physician in training was either directly reviewed and/or replicated by myself.  Any procedure performed by the physician in training was performed under my direct supervision and with the consent of the patient.\par \par This note was generated using dragon medical dictation software.  A reasonable effort has been made for proofreading its contents, but typos may still remain.  If there are any questions or points of clarification needed please notify my office.

## 2023-06-28 ENCOUNTER — APPOINTMENT (OUTPATIENT)
Dept: MAMMOGRAPHY | Facility: IMAGING CENTER | Age: 43
End: 2023-06-28
Payer: COMMERCIAL

## 2023-06-28 ENCOUNTER — OUTPATIENT (OUTPATIENT)
Dept: OUTPATIENT SERVICES | Facility: HOSPITAL | Age: 43
LOS: 1 days | End: 2023-06-28
Payer: COMMERCIAL

## 2023-06-28 DIAGNOSIS — Z00.8 ENCOUNTER FOR OTHER GENERAL EXAMINATION: ICD-10-CM

## 2023-06-28 PROCEDURE — 77063 BREAST TOMOSYNTHESIS BI: CPT

## 2023-06-28 PROCEDURE — 77067 SCR MAMMO BI INCL CAD: CPT | Mod: 26

## 2023-06-28 PROCEDURE — 77063 BREAST TOMOSYNTHESIS BI: CPT | Mod: 26

## 2023-06-28 PROCEDURE — 77067 SCR MAMMO BI INCL CAD: CPT

## 2023-07-05 ENCOUNTER — APPOINTMENT (OUTPATIENT)
Dept: ORTHOPEDIC SURGERY | Facility: CLINIC | Age: 43
End: 2023-07-05
Payer: OTHER MISCELLANEOUS

## 2023-07-05 ENCOUNTER — TRANSCRIPTION ENCOUNTER (OUTPATIENT)
Age: 43
End: 2023-07-05

## 2023-07-05 PROCEDURE — 99213 OFFICE O/P EST LOW 20 MIN: CPT

## 2023-07-05 NOTE — DISCUSSION/SUMMARY
[de-identified] : Patient was seen today for reevaluation of right shoulder pain secondary to subacromial impingement.  She had very good relief from cortisone injection provided last visit and has made very good progress with her course of physical therapy.  Patient no longer has any pain, she has full range of motion and full strength.  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  Follow up as needed.  Patient appreciates and agrees with current plan.\par \par I work as part of an academic orthopedic group and routinely have a physician in training (resident / fellow) working with me.  Any part of the history and physical exam performed by the physician in training was either directly reviewed and/or replicated by myself.  Any procedure performed by the physician in training was performed under my direct supervision and with the consent of the patient.\par \par This note was generated using dragon medical dictation software.  A reasonable effort has been made for proofreading its contents, but typos may still remain.  If there are any questions or points of clarification needed please notify my office.\par

## 2023-07-05 NOTE — RETURN TO WORK/SCHOOL
[FreeTextEntry1] : Silvia is cleared for full work duty without restrictions as of 7/10/2023.\par Should you have any questions please call the office at [phone]\par Thank you for your understanding.\par \par Sincerely,\par \par Kory Black DO, ATC\par Primary Care Sports Medicine\par [facility] [place]\par

## 2023-07-05 NOTE — HISTORY OF PRESENT ILLNESS
[de-identified] : Patient is here for right shoulder pain follow up. Reports injection provided her 1.5 weeks of relief however still having slight burning at the lateral region of the shoulder with overuse, however still doing PT. 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

## 2023-07-05 NOTE — PHYSICAL EXAM
[de-identified] : Constitutional: Well-nourished, well-developed, No acute distress\par Respiratory:  Good respiratory effort, no SOB\par Lymphatic: No regional lymphadenopathy, no lymphedema\par Psychiatric: Pleasant and normal affect, alert and oriented x3\par Musculoskeletal: normal except where as noted in regional exam\par \par Right shoulder:\par APPEARANCE: no marked deformities, no swelling or malalignment\par POSITIVE TENDERNESS: None\par NONTENDER: supraspinatus, infraspinatus, teres minor, LH biceps, anterior and posterior capsule, AC joint \par ROM: full & painless, no scapular winging or dyskinesia present\par RESISTIVE TESTING: painless 5/5 resisted flex/ext, empty can/ER/IR, horizontal abd/add \par SPECIAL TESTS: neg Drop Arm, neg Empty Can, neg Eisenberg/Neers, neg Russell's, neg Speeds, neg Apprehension, neg cross arm adduction, neg apley's scratch test\par

## 2023-08-02 NOTE — ED ADULT TRIAGE NOTE - WEIGHT IN LBS
156.9 normal/ROM intact/normal gait/strength 5/5 bilateral upper extremities/strength 5/5 bilateral lower extremities negative

## 2023-09-19 ENCOUNTER — APPOINTMENT (OUTPATIENT)
Dept: ORTHOPEDIC SURGERY | Facility: CLINIC | Age: 43
End: 2023-09-19
Payer: OTHER MISCELLANEOUS

## 2023-09-19 DIAGNOSIS — M25.811 OTHER SPECIFIED JOINT DISORDERS, RIGHT SHOULDER: ICD-10-CM

## 2023-09-19 PROCEDURE — 99213 OFFICE O/P EST LOW 20 MIN: CPT

## 2024-02-07 ENCOUNTER — NON-APPOINTMENT (OUTPATIENT)
Age: 44
End: 2024-02-07

## 2024-05-29 ENCOUNTER — EMERGENCY (EMERGENCY)
Facility: HOSPITAL | Age: 44
LOS: 1 days | Discharge: HOME | End: 2024-05-29
Attending: EMERGENCY MEDICINE
Payer: COMMERCIAL

## 2024-05-29 ENCOUNTER — NON-APPOINTMENT (OUTPATIENT)
Age: 44
End: 2024-05-29

## 2024-05-29 VITALS
DIASTOLIC BLOOD PRESSURE: 87 MMHG | SYSTOLIC BLOOD PRESSURE: 129 MMHG | HEART RATE: 69 BPM | WEIGHT: 164.02 LBS | RESPIRATION RATE: 16 BRPM | OXYGEN SATURATION: 100 % | TEMPERATURE: 99 F | HEIGHT: 68 IN

## 2024-05-29 PROCEDURE — 73610 X-RAY EXAM OF ANKLE: CPT | Mod: 26,RT

## 2024-05-29 PROCEDURE — 73610 X-RAY EXAM OF ANKLE: CPT

## 2024-05-29 PROCEDURE — 99284 EMERGENCY DEPT VISIT MOD MDM: CPT

## 2024-05-29 PROCEDURE — 99284 EMERGENCY DEPT VISIT MOD MDM: CPT | Mod: 25

## 2024-05-29 RX ORDER — IBUPROFEN 200 MG
600 TABLET ORAL ONCE
Refills: 0 | Status: COMPLETED | OUTPATIENT
Start: 2024-05-29 | End: 2024-05-29

## 2024-05-29 RX ADMIN — Medication 600 MILLIGRAM(S): at 23:01

## 2024-05-29 NOTE — ED PROVIDER NOTE - CROS ED CONS ALL NEG
FYI  A Service To Gastroenterology order was placed for this patient. Our attempts to reach the patient by phone have been unsuccessful. A letter was mailed to patient's home address.      Thanks,  [employer] Rep/Scheduling Team    No phone number  Lemuel Dinero   negative...

## 2024-05-29 NOTE — ED PROVIDER NOTE - PATIENT PORTAL LINK FT
You can access the FollowMyHealth Patient Portal offered by [facility] by registering at the following website: [email or web address]. By joining [facility]’s FollowMyHealth portal, you will also be able to view your health information using other applications (apps) compatible with our system.

## 2024-05-29 NOTE — ED PROVIDER NOTE - PHYSICAL EXAMINATION
Attending note.  Patient is alert no acute distress.  Examination of the right lower extremity reveals no swelling or deformity.  Proximal leg is nontender.  Squeeze test is negative.  Patient has tenderness over the right lateral malleolus.  There is no midfoot or forefoot tenderness.  Achilles is nontender.

## 2024-05-29 NOTE — ED PROVIDER NOTE - OBJECTIVE STATEMENT
Attending note.  Patient was seen in room #32 to the left.  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  Patient complaining of pain in the lateral right ankle.  She denies any other injury.  She has past medical history of hypertension.  Patient was working a 12-hour shift - 7A-7P.

## 2024-05-29 NOTE — ED PROVIDER NOTE - NSFOLLOWUPINSTRUCTIONS_ED_ALL_ED_FT
Apply ice to area 20 minutes on area every 2-4 hours for the next 48-72 hours.  Tylenol (acetaminophen) two tablets every 4-6 hours or Motrin (Ibuprofen) 2-3 tabs every 6-8 hours if needed.  Activity as tolerated.

## 2024-05-29 NOTE — ED PROVIDER NOTE - CLINICAL SUMMARY MEDICAL DECISION MAKING FREE TEXT BOX
Attending note.  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  X-ray.  Very low suspicion for fracture.  Ice, analgesia.

## 2024-05-29 NOTE — ED ADULT NURSE NOTE - OBJECTIVE STATEMENT
45 y/o female, A&O x4, PMH HTN and Glaucoma presents to the ED c/o right ankle pain/injury. Pt employee upstairs on [employer] medical device fell onto her right ankle, immediate pain. Pt states able to ambulate to medication room to retrieve ice, manager sent her to ED to be evaluated. Pt affect is calm and appropriate, spontaneous unlabored breathing, abdomen soft nondistended nontender, PERRLA, right ankle red mild swelling, ambulation w/ pain. Pt denies chest pain, SOB/difficulty breathing, fever/chills, HA, abd pain, N/V/D, lightheadedness/dizziness, numbness/tingling. Safety and comfort measures maintained.

## 2024-05-30 VITALS
OXYGEN SATURATION: 99 % | RESPIRATION RATE: 18 BRPM | SYSTOLIC BLOOD PRESSURE: 116 MMHG | TEMPERATURE: 99 F | DIASTOLIC BLOOD PRESSURE: 71 MMHG | HEART RATE: 73 BPM

## 2024-06-03 PROBLEM — I10 ESSENTIAL (PRIMARY) HYPERTENSION: Chronic | Status: ACTIVE | Noted: 2024-05-29

## 2024-06-04 ENCOUNTER — APPOINTMENT (OUTPATIENT)
Dept: ORTHOPEDIC SURGERY | Facility: CLINIC | Age: 44
End: 2024-06-04
Payer: OTHER MISCELLANEOUS

## 2024-06-04 VITALS
BODY MASS INDEX: 24.86 KG/M2 | SYSTOLIC BLOOD PRESSURE: 131 MMHG | HEIGHT: 68 IN | DIASTOLIC BLOOD PRESSURE: 88 MMHG | HEART RATE: 73 BPM | WEIGHT: 164 LBS

## 2024-06-04 DIAGNOSIS — S99.911A UNSPECIFIED INJURY OF RIGHT ANKLE, INITIAL ENCOUNTER: ICD-10-CM

## 2024-06-04 PROCEDURE — 99214 OFFICE O/P EST MOD 30 MIN: CPT

## 2024-06-11 ENCOUNTER — APPOINTMENT (OUTPATIENT)
Dept: ORTHOPEDIC SURGERY | Facility: CLINIC | Age: 44
End: 2024-06-11
Payer: OTHER MISCELLANEOUS

## 2024-06-11 VITALS — WEIGHT: 164 LBS | HEIGHT: 68 IN | BODY MASS INDEX: 24.86 KG/M2

## 2024-06-11 DIAGNOSIS — T14.8XXA OTHER INJURY OF UNSPECIFIED BODY REGION, INITIAL ENCOUNTER: ICD-10-CM

## 2024-06-11 DIAGNOSIS — S99.911D UNSPECIFIED INJURY OF RIGHT ANKLE, SUBSEQUENT ENCOUNTER: ICD-10-CM

## 2024-06-11 PROCEDURE — 99213 OFFICE O/P EST LOW 20 MIN: CPT

## 2024-06-11 RX ORDER — MELOXICAM 7.5 MG/1
7.5 TABLET ORAL
Qty: 21 | Refills: 0 | Status: ACTIVE | COMMUNITY
Start: 2024-06-11 | End: 1900-01-01

## 2024-06-17 ENCOUNTER — APPOINTMENT (OUTPATIENT)
Age: 44
End: 2024-06-17

## 2024-06-17 PROCEDURE — D0220: CPT

## 2024-06-17 PROCEDURE — D0140: CPT

## 2024-06-17 PROCEDURE — D0270 BITEWING - SINGLE RADIOGRAPHIC IMAGE: CPT

## 2024-07-02 ENCOUNTER — APPOINTMENT (OUTPATIENT)
Dept: ORTHOPEDIC SURGERY | Facility: CLINIC | Age: 44
End: 2024-07-02
Payer: OTHER MISCELLANEOUS

## 2024-07-02 VITALS — HEIGHT: 68 IN | BODY MASS INDEX: 24.86 KG/M2 | WEIGHT: 164 LBS

## 2024-07-02 DIAGNOSIS — T14.8XXA OTHER INJURY OF UNSPECIFIED BODY REGION, INITIAL ENCOUNTER: ICD-10-CM

## 2024-07-02 DIAGNOSIS — S99.911D UNSPECIFIED INJURY OF RIGHT ANKLE, SUBSEQUENT ENCOUNTER: ICD-10-CM

## 2024-07-02 PROCEDURE — 99213 OFFICE O/P EST LOW 20 MIN: CPT

## 2024-07-02 RX ORDER — IBUPROFEN 600 MG/1
600 TABLET, FILM COATED ORAL 3 TIMES DAILY
Qty: 90 | Refills: 1 | Status: ACTIVE | COMMUNITY
Start: 2024-07-02 | End: 1900-01-01

## 2024-07-10 ENCOUNTER — APPOINTMENT (OUTPATIENT)
Age: 44
End: 2024-07-10

## 2024-07-12 ENCOUNTER — OUTPATIENT (OUTPATIENT)
Dept: OUTPATIENT SERVICES | Facility: HOSPITAL | Age: 44
LOS: 1 days | End: 2024-07-12
Payer: COMMERCIAL

## 2024-07-12 ENCOUNTER — APPOINTMENT (OUTPATIENT)
Dept: MRI IMAGING | Facility: CLINIC | Age: 44
End: 2024-07-12
Payer: COMMERCIAL

## 2024-07-12 DIAGNOSIS — S99.911D UNSPECIFIED INJURY OF RIGHT ANKLE, SUBSEQUENT ENCOUNTER: ICD-10-CM

## 2024-07-12 DIAGNOSIS — Z00.8 ENCOUNTER FOR OTHER GENERAL EXAMINATION: ICD-10-CM

## 2024-07-12 PROCEDURE — 73721 MRI JNT OF LWR EXTRE W/O DYE: CPT | Mod: 26,RT

## 2024-07-12 PROCEDURE — 73721 MRI JNT OF LWR EXTRE W/O DYE: CPT

## 2024-08-13 ENCOUNTER — APPOINTMENT (OUTPATIENT)
Dept: ORTHOPEDIC SURGERY | Facility: CLINIC | Age: 44
End: 2024-08-13

## 2024-08-20 ENCOUNTER — APPOINTMENT (OUTPATIENT)
Dept: ORTHOPEDIC SURGERY | Facility: CLINIC | Age: 44
End: 2024-08-20
Payer: OTHER MISCELLANEOUS

## 2024-08-20 ENCOUNTER — APPOINTMENT (OUTPATIENT)
Dept: MAMMOGRAPHY | Facility: IMAGING CENTER | Age: 44
End: 2024-08-20
Payer: COMMERCIAL

## 2024-08-20 ENCOUNTER — OUTPATIENT (OUTPATIENT)
Dept: OUTPATIENT SERVICES | Facility: HOSPITAL | Age: 44
LOS: 1 days | End: 2024-08-20
Payer: COMMERCIAL

## 2024-08-20 VITALS — WEIGHT: 164 LBS | BODY MASS INDEX: 24.86 KG/M2 | HEIGHT: 68 IN

## 2024-08-20 DIAGNOSIS — S99.911D UNSPECIFIED INJURY OF RIGHT ANKLE, SUBSEQUENT ENCOUNTER: ICD-10-CM

## 2024-08-20 DIAGNOSIS — Z12.31 ENCOUNTER FOR SCREENING MAMMOGRAM FOR MALIGNANT NEOPLASM OF BREAST: ICD-10-CM

## 2024-08-20 DIAGNOSIS — Z00.8 ENCOUNTER FOR OTHER GENERAL EXAMINATION: ICD-10-CM

## 2024-08-20 DIAGNOSIS — M24.171 OTHER ARTICULAR CARTILAGE DISORDERS, RIGHT ANKLE: ICD-10-CM

## 2024-08-20 PROCEDURE — 77067 SCR MAMMO BI INCL CAD: CPT

## 2024-08-20 PROCEDURE — 77067 SCR MAMMO BI INCL CAD: CPT | Mod: 26

## 2024-08-20 PROCEDURE — 99213 OFFICE O/P EST LOW 20 MIN: CPT

## 2024-08-20 PROCEDURE — 77063 BREAST TOMOSYNTHESIS BI: CPT | Mod: 26

## 2024-08-20 PROCEDURE — 77063 BREAST TOMOSYNTHESIS BI: CPT

## 2024-10-02 ENCOUNTER — APPOINTMENT (OUTPATIENT)
Dept: ORTHOPEDIC SURGERY | Facility: CLINIC | Age: 44
End: 2024-10-02

## 2024-10-02 VITALS
WEIGHT: 162 LBS | SYSTOLIC BLOOD PRESSURE: 123 MMHG | HEART RATE: 79 BPM | HEIGHT: 68 IN | DIASTOLIC BLOOD PRESSURE: 77 MMHG | BODY MASS INDEX: 24.55 KG/M2

## 2024-10-02 DIAGNOSIS — S99.911D UNSPECIFIED INJURY OF RIGHT ANKLE, SUBSEQUENT ENCOUNTER: ICD-10-CM

## 2024-10-02 DIAGNOSIS — S96.911S STRAIN OF UNSPECIFIED MUSCLE AND TENDON AT ANKLE AND FOOT LEVEL, RIGHT FOOT, SEQUELA: ICD-10-CM

## 2024-10-02 DIAGNOSIS — M24.171 OTHER ARTICULAR CARTILAGE DISORDERS, RIGHT ANKLE: ICD-10-CM

## 2024-10-02 PROCEDURE — 99213 OFFICE O/P EST LOW 20 MIN: CPT | Mod: 25

## 2024-10-02 PROCEDURE — 20605 DRAIN/INJ JOINT/BURSA W/O US: CPT | Mod: RT

## 2024-10-03 PROBLEM — S96.911S STRAIN OF RIGHT ANKLE, SEQUELA: Status: ACTIVE | Noted: 2024-10-03

## 2024-10-03 NOTE — HISTORY OF PRESENT ILLNESS
[FreeTextEntry1] : Ms. VANDANA SAAB is a 44-year-old woman presents today for follow up evaluation of a right ankle injury / contusion sustained on 5/29/24, states improving since last visit. She was walking past the nurse's station and a bunch of pneumatic tubes landed on her right ankle. She notes pain at the lateral aspect of the right ankle. She is taking Ibuprofen and applying ice or heat which provides some relief. She has will sometimes wear a compression sleeve to the right ankle which helps the pain. She states mild interval improvement since last visit and notes pain at the lateral aspect of the left ankle with prolonged walking and eversion of the ankle. 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 She is here to review her MRI.

## 2024-10-03 NOTE — DISCUSSION/SUMMARY
[Surgical risks reviewed] : Surgical risks reviewed [de-identified] : Options reviewed and patient requesting diagnostic injection R ankle (risks, benefits reviewed in advance).  Under sterile conditions, injection 5 cc Lidocaine 1% to R ankle joint, tolerated well by patient and Band-Aid applied, amelioration of discomfort with exertional activity.  Options again reviewed including potential surgical intervention operative arthroscopy procedure under consideration by patient at this juncture. NB shoe wear, activity modification, physical therapy / rehabilitation and associated modalities, HEP.  Return to office in 6 - 8 weeks / PRN, all questions answered.

## 2024-10-03 NOTE — PHYSICAL EXAM
[Normal] : Oriented to person, place, and time, insight and judgement were intact and the affect was normal [de-identified] : EXAM: [id number] - MR ANKLE RT  - ORDERED BY: HAKEEM MCKEON PROCEDURE DATE:  07/12/2024 INTERPRETATION:  EXAMINATION: MR ANKLE RIGHT CLINICAL INDICATION: Right ankle injury and pain. Assess soft tissues and ligaments. COMPARISON: Radiographs dated 5/29/2024 TECHNIQUE: Multiplanar, multi-sequence MRI of the right ankle was performed without intravenous contrast. INTERPRETATION:  Joint space: There is no effusion.  Bones and articular cartilage: There is no fracture or bone marrow edema. There is no focal cartilage defect.  Tendons and bursae: Mild thickening of the distal Achilles tendon, consistent with tendinosis. Trace posterior tibialis tenosynovitis. The remaining flexor, extensor, and peroneal tendons are intact.  There is no abnormal bursal distension.  Ligaments: Mild scar remodeling of the anterior talofibular ligament with intermediate T2 signal, consistent with remote sprain. The tibiofibular, posterior talofibular, calcaneofibular, deltoid, and spring ligaments are intact.  Plantar fascia: The plantar fascia is normal in signal and thickness.  IMPRESSION: 1.  Very mild Achilles tendinosis. 2.  Mild scar remodeling of ATFL. 3.  Trace posterior tibialis tenosynovitis.  --- End of Report --- MIRNA RAMOS MD; Resident Radiologist This document has been electronically signed. SHLOMIT GOLDBERG STEIN MD; Attending Radiologist This document has been electronically signed. Jul 17 2024 11:06PM [de-identified] : Extremity: nearly resolved soft tissue swelling lateral R ankle, nontender R distal fibula, mild residual tenderness anterolateral aspect R ankle.  Nontender R peroneals, syndesmosis, Achilles, medial malleolus, hindfoot ST, midfoot LF and PTT insertional, and forefoot / base 5th metatarsal.  Stable Drawer testing R ankle, able to perform R SLHR testing, 5 / 5 evertor strength R ankle, sensorimotor unchanged, skin intact R LE.  AOx3, mood / affect normal. [de-identified] : TOM, NAD

## 2024-10-03 NOTE — DISCUSSION/SUMMARY
[Surgical risks reviewed] : Surgical risks reviewed [de-identified] : Options reviewed and patient requesting diagnostic injection R ankle (risks, benefits reviewed in advance).  Under sterile conditions, injection 5 cc Lidocaine 1% to R ankle joint, tolerated well by patient and Band-Aid applied, amelioration of discomfort with exertional activity.  Options again reviewed including potential surgical intervention operative arthroscopy procedure under consideration by patient at this juncture. NB shoe wear, activity modification, physical therapy / rehabilitation and associated modalities, HEP.  Return to office in 6 - 8 weeks / PRN, all questions answered.

## 2024-10-03 NOTE — PHYSICAL EXAM
[Normal] : Oriented to person, place, and time, insight and judgement were intact and the affect was normal [de-identified] : EXAM: [id number] - MR ANKLE RT  - ORDERED BY: HAKEEM MCKEON PROCEDURE DATE:  07/12/2024 INTERPRETATION:  EXAMINATION: MR ANKLE RIGHT CLINICAL INDICATION: Right ankle injury and pain. Assess soft tissues and ligaments. COMPARISON: Radiographs dated 5/29/2024 TECHNIQUE: Multiplanar, multi-sequence MRI of the right ankle was performed without intravenous contrast. INTERPRETATION:  Joint space: There is no effusion.  Bones and articular cartilage: There is no fracture or bone marrow edema. There is no focal cartilage defect.  Tendons and bursae: Mild thickening of the distal Achilles tendon, consistent with tendinosis. Trace posterior tibialis tenosynovitis. The remaining flexor, extensor, and peroneal tendons are intact.  There is no abnormal bursal distension.  Ligaments: Mild scar remodeling of the anterior talofibular ligament with intermediate T2 signal, consistent with remote sprain. The tibiofibular, posterior talofibular, calcaneofibular, deltoid, and spring ligaments are intact.  Plantar fascia: The plantar fascia is normal in signal and thickness.  IMPRESSION: 1.  Very mild Achilles tendinosis. 2.  Mild scar remodeling of ATFL. 3.  Trace posterior tibialis tenosynovitis.  --- End of Report --- MIRNA RAMOS MD; Resident Radiologist This document has been electronically signed. SHLOMIT GOLDBERG STEIN MD; Attending Radiologist This document has been electronically signed. Jul 17 2024 11:06PM [de-identified] : Extremity: nearly resolved soft tissue swelling lateral R ankle, nontender R distal fibula, mild residual tenderness anterolateral aspect R ankle.  Nontender R peroneals, syndesmosis, Achilles, medial malleolus, hindfoot ST, midfoot LF and PTT insertional, and forefoot / base 5th metatarsal.  Stable Drawer testing R ankle, able to perform R SLHR testing, 5 / 5 evertor strength R ankle, sensorimotor unchanged, skin intact R LE.  AOx3, mood / affect normal. [de-identified] : TOM, NAD

## 2024-10-22 ENCOUNTER — APPOINTMENT (OUTPATIENT)
Dept: ORTHOPEDIC SURGERY | Facility: CLINIC | Age: 44
End: 2024-10-22

## 2024-11-04 ENCOUNTER — APPOINTMENT (OUTPATIENT)
Dept: ORTHOPEDIC SURGERY | Facility: CLINIC | Age: 44
End: 2024-11-04
Payer: OTHER MISCELLANEOUS

## 2024-11-04 DIAGNOSIS — S99.911D UNSPECIFIED INJURY OF RIGHT ANKLE, SUBSEQUENT ENCOUNTER: ICD-10-CM

## 2024-11-04 DIAGNOSIS — M24.171 OTHER ARTICULAR CARTILAGE DISORDERS, RIGHT ANKLE: ICD-10-CM

## 2024-11-04 PROCEDURE — 99213 OFFICE O/P EST LOW 20 MIN: CPT

## 2025-02-04 ENCOUNTER — APPOINTMENT (OUTPATIENT)
Dept: ORTHOPEDIC SURGERY | Facility: CLINIC | Age: 45
End: 2025-02-04

## 2025-05-08 ENCOUNTER — APPOINTMENT (OUTPATIENT)
Age: 45
End: 2025-05-08
Payer: COMMERCIAL

## 2025-05-09 PROCEDURE — PIP: CUSTOM
